# Patient Record
Sex: FEMALE | Race: WHITE | Employment: FULL TIME | URBAN - METROPOLITAN AREA
[De-identification: names, ages, dates, MRNs, and addresses within clinical notes are randomized per-mention and may not be internally consistent; named-entity substitution may affect disease eponyms.]

---

## 2021-12-30 ENCOUNTER — ANESTHESIA (OUTPATIENT)
Dept: SURGERY | Age: 26
DRG: 742 | End: 2021-12-30

## 2021-12-30 ENCOUNTER — APPOINTMENT (OUTPATIENT)
Dept: CT IMAGING | Age: 26
DRG: 742 | End: 2021-12-30
Attending: EMERGENCY MEDICINE

## 2021-12-30 ENCOUNTER — HOSPITAL ENCOUNTER (INPATIENT)
Age: 26
LOS: 1 days | Discharge: HOME | DRG: 742 | End: 2021-12-31
Attending: EMERGENCY MEDICINE | Admitting: OBSTETRICS & GYNECOLOGY

## 2021-12-30 ENCOUNTER — ANESTHESIA EVENT (OUTPATIENT)
Dept: SURGERY | Age: 26
DRG: 742 | End: 2021-12-30

## 2021-12-30 DIAGNOSIS — N13.30 HYDRONEPHROSIS, UNSPECIFIED HYDRONEPHROSIS TYPE: ICD-10-CM

## 2021-12-30 DIAGNOSIS — R19.00 PELVIC MASS: ICD-10-CM

## 2021-12-30 DIAGNOSIS — D49.59 OVARIAN NEOPLASM: Primary | ICD-10-CM

## 2021-12-30 LAB
ALBUMIN SERPL-MCNC: 4.2 G/DL (ref 3.5–5)
ALBUMIN/GLOB SERPL: 1 {RATIO} (ref 1.1–2.2)
ALP SERPL-CCNC: 76 U/L (ref 45–117)
ALT SERPL-CCNC: 17 U/L (ref 12–78)
ANION GAP SERPL CALC-SCNC: 5 MMOL/L (ref 5–15)
APPEARANCE UR: CLEAR
AST SERPL-CCNC: 17 U/L (ref 15–37)
BACTERIA URNS QL MICRO: NEGATIVE /HPF
BASOPHILS # BLD: 0.1 K/UL (ref 0–0.1)
BASOPHILS NFR BLD: 1 % (ref 0–1)
BILIRUB SERPL-MCNC: 0.6 MG/DL (ref 0.2–1)
BILIRUB UR QL: NEGATIVE
BUN SERPL-MCNC: 16 MG/DL (ref 6–20)
BUN/CREAT SERPL: 19 (ref 12–20)
CALCIUM SERPL-MCNC: 9.2 MG/DL (ref 8.5–10.1)
CHLORIDE SERPL-SCNC: 104 MMOL/L (ref 97–108)
CO2 SERPL-SCNC: 27 MMOL/L (ref 21–32)
COLOR UR: ABNORMAL
COMMENT, HOLDF: NORMAL
COVID-19 RAPID TEST, COVR: NOT DETECTED
CREAT SERPL-MCNC: 0.84 MG/DL (ref 0.55–1.02)
DIFFERENTIAL METHOD BLD: ABNORMAL
EOSINOPHIL # BLD: 0 K/UL (ref 0–0.4)
EOSINOPHIL NFR BLD: 0 % (ref 0–7)
EPITH CASTS URNS QL MICRO: ABNORMAL /LPF
ERYTHROCYTE [DISTWIDTH] IN BLOOD BY AUTOMATED COUNT: 13.2 % (ref 11.5–14.5)
GLOBULIN SER CALC-MCNC: 4.2 G/DL (ref 2–4)
GLUCOSE SERPL-MCNC: 88 MG/DL (ref 65–100)
GLUCOSE UR STRIP.AUTO-MCNC: NEGATIVE MG/DL
HCG UR QL: NEGATIVE
HCT VFR BLD AUTO: 44.1 % (ref 35–47)
HGB BLD-MCNC: 14.1 G/DL (ref 11.5–16)
HGB UR QL STRIP: ABNORMAL
HYALINE CASTS URNS QL MICRO: ABNORMAL /LPF (ref 0–5)
IMM GRANULOCYTES # BLD AUTO: 0 K/UL (ref 0–0.04)
IMM GRANULOCYTES NFR BLD AUTO: 0 % (ref 0–0.5)
KETONES UR QL STRIP.AUTO: NEGATIVE MG/DL
LEUKOCYTE ESTERASE UR QL STRIP.AUTO: NEGATIVE
LIPASE SERPL-CCNC: 91 U/L (ref 73–393)
LYMPHOCYTES # BLD: 2.6 K/UL (ref 0.8–3.5)
LYMPHOCYTES NFR BLD: 23 % (ref 12–49)
MCH RBC QN AUTO: 27.2 PG (ref 26–34)
MCHC RBC AUTO-ENTMCNC: 32 G/DL (ref 30–36.5)
MCV RBC AUTO: 85.1 FL (ref 80–99)
MONOCYTES # BLD: 0.7 K/UL (ref 0–1)
MONOCYTES NFR BLD: 6 % (ref 5–13)
NEUTS SEG # BLD: 7.9 K/UL (ref 1.8–8)
NEUTS SEG NFR BLD: 70 % (ref 32–75)
NITRITE UR QL STRIP.AUTO: NEGATIVE
NRBC # BLD: 0 K/UL (ref 0–0.01)
NRBC BLD-RTO: 0 PER 100 WBC
PH UR STRIP: 6 [PH] (ref 5–8)
PLATELET # BLD AUTO: 354 K/UL (ref 150–400)
PMV BLD AUTO: 10.7 FL (ref 8.9–12.9)
POTASSIUM SERPL-SCNC: 3.6 MMOL/L (ref 3.5–5.1)
PROT SERPL-MCNC: 8.4 G/DL (ref 6.4–8.2)
PROT UR STRIP-MCNC: NEGATIVE MG/DL
RBC # BLD AUTO: 5.18 M/UL (ref 3.8–5.2)
RBC #/AREA URNS HPF: ABNORMAL /HPF (ref 0–5)
SAMPLES BEING HELD,HOLD: NORMAL
SODIUM SERPL-SCNC: 136 MMOL/L (ref 136–145)
SOURCE, COVRS: NORMAL
SP GR UR REFRACTOMETRY: 1.02 (ref 1–1.03)
UR CULT HOLD, URHOLD: NORMAL
UROBILINOGEN UR QL STRIP.AUTO: 0.2 EU/DL (ref 0.2–1)
WBC # BLD AUTO: 11.4 K/UL (ref 3.6–11)
WBC URNS QL MICRO: ABNORMAL /HPF (ref 0–4)

## 2021-12-30 PROCEDURE — 65410000002 HC RM PRIVATE OB

## 2021-12-30 PROCEDURE — 77030011264 HC ELECTRD BLD EXT COVD -A: Performed by: OBSTETRICS & GYNECOLOGY

## 2021-12-30 PROCEDURE — 2709999900 HC NON-CHARGEABLE SUPPLY: Performed by: OBSTETRICS & GYNECOLOGY

## 2021-12-30 PROCEDURE — 88307 TISSUE EXAM BY PATHOLOGIST: CPT

## 2021-12-30 PROCEDURE — 77030040361 HC SLV COMPR DVT MDII -B: Performed by: OBSTETRICS & GYNECOLOGY

## 2021-12-30 PROCEDURE — 77030011278 HC ELECTRD LIG IMPT COVD -F: Performed by: OBSTETRICS & GYNECOLOGY

## 2021-12-30 PROCEDURE — 99284 EMERGENCY DEPT VISIT MOD MDM: CPT

## 2021-12-30 PROCEDURE — 76210000016 HC OR PH I REC 1 TO 1.5 HR: Performed by: OBSTETRICS & GYNECOLOGY

## 2021-12-30 PROCEDURE — 74011000250 HC RX REV CODE- 250: Performed by: ANESTHESIOLOGY

## 2021-12-30 PROCEDURE — 76060000034 HC ANESTHESIA 1.5 TO 2 HR: Performed by: OBSTETRICS & GYNECOLOGY

## 2021-12-30 PROCEDURE — 80053 COMPREHEN METABOLIC PANEL: CPT

## 2021-12-30 PROCEDURE — 77030040830 HC CATH URETH FOL MDII -A: Performed by: OBSTETRICS & GYNECOLOGY

## 2021-12-30 PROCEDURE — 74011000250 HC RX REV CODE- 250: Performed by: NURSE ANESTHETIST, CERTIFIED REGISTERED

## 2021-12-30 PROCEDURE — P9045 ALBUMIN (HUMAN), 5%, 250 ML: HCPCS | Performed by: NURSE ANESTHETIST, CERTIFIED REGISTERED

## 2021-12-30 PROCEDURE — 81025 URINE PREGNANCY TEST: CPT

## 2021-12-30 PROCEDURE — 77030002996 HC SUT SLK J&J -A: Performed by: OBSTETRICS & GYNECOLOGY

## 2021-12-30 PROCEDURE — 77030026438 HC STYL ET INTUB CARD -A: Performed by: NURSE ANESTHETIST, CERTIFIED REGISTERED

## 2021-12-30 PROCEDURE — 81001 URINALYSIS AUTO W/SCOPE: CPT

## 2021-12-30 PROCEDURE — 77030002966 HC SUT PDS J&J -A: Performed by: OBSTETRICS & GYNECOLOGY

## 2021-12-30 PROCEDURE — 74011250637 HC RX REV CODE- 250/637: Performed by: OBSTETRICS & GYNECOLOGY

## 2021-12-30 PROCEDURE — 76010000153 HC OR TIME 1.5 TO 2 HR: Performed by: OBSTETRICS & GYNECOLOGY

## 2021-12-30 PROCEDURE — 77030002933 HC SUT MCRYL J&J -A: Performed by: OBSTETRICS & GYNECOLOGY

## 2021-12-30 PROCEDURE — 74011250636 HC RX REV CODE- 250/636: Performed by: ANESTHESIOLOGY

## 2021-12-30 PROCEDURE — 77030008684 HC TU ET CUF COVD -B: Performed by: NURSE ANESTHETIST, CERTIFIED REGISTERED

## 2021-12-30 PROCEDURE — 74011000636 HC RX REV CODE- 636: Performed by: RADIOLOGY

## 2021-12-30 PROCEDURE — 85025 COMPLETE CBC W/AUTO DIFF WBC: CPT

## 2021-12-30 PROCEDURE — 77030012935 HC DRSG AQUACEL BMS -B: Performed by: OBSTETRICS & GYNECOLOGY

## 2021-12-30 PROCEDURE — 74011250636 HC RX REV CODE- 250/636: Performed by: OBSTETRICS & GYNECOLOGY

## 2021-12-30 PROCEDURE — 83690 ASSAY OF LIPASE: CPT

## 2021-12-30 PROCEDURE — 0UT60ZZ RESECTION OF LEFT FALLOPIAN TUBE, OPEN APPROACH: ICD-10-PCS | Performed by: OBSTETRICS & GYNECOLOGY

## 2021-12-30 PROCEDURE — 87635 SARS-COV-2 COVID-19 AMP PRB: CPT

## 2021-12-30 PROCEDURE — 74177 CT ABD & PELVIS W/CONTRAST: CPT

## 2021-12-30 PROCEDURE — G0378 HOSPITAL OBSERVATION PER HR: HCPCS

## 2021-12-30 PROCEDURE — 74011250636 HC RX REV CODE- 250/636: Performed by: NURSE ANESTHETIST, CERTIFIED REGISTERED

## 2021-12-30 PROCEDURE — 77030031139 HC SUT VCRL2 J&J -A: Performed by: OBSTETRICS & GYNECOLOGY

## 2021-12-30 PROCEDURE — 0UT10ZZ RESECTION OF LEFT OVARY, OPEN APPROACH: ICD-10-PCS | Performed by: OBSTETRICS & GYNECOLOGY

## 2021-12-30 RX ORDER — LIDOCAINE HYDROCHLORIDE 10 MG/ML
0.1 INJECTION, SOLUTION EPIDURAL; INFILTRATION; INTRACAUDAL; PERINEURAL AS NEEDED
Status: DISCONTINUED | OUTPATIENT
Start: 2021-12-30 | End: 2021-12-30 | Stop reason: HOSPADM

## 2021-12-30 RX ORDER — ONDANSETRON 2 MG/ML
INJECTION INTRAMUSCULAR; INTRAVENOUS AS NEEDED
Status: DISCONTINUED | OUTPATIENT
Start: 2021-12-30 | End: 2021-12-30 | Stop reason: HOSPADM

## 2021-12-30 RX ORDER — OXYCODONE AND ACETAMINOPHEN 5; 325 MG/1; MG/1
1 TABLET ORAL AS NEEDED
Status: DISCONTINUED | OUTPATIENT
Start: 2021-12-30 | End: 2021-12-30 | Stop reason: HOSPADM

## 2021-12-30 RX ORDER — MIDAZOLAM HYDROCHLORIDE 1 MG/ML
INJECTION, SOLUTION INTRAMUSCULAR; INTRAVENOUS AS NEEDED
Status: DISCONTINUED | OUTPATIENT
Start: 2021-12-30 | End: 2021-12-30 | Stop reason: HOSPADM

## 2021-12-30 RX ORDER — ACETAMINOPHEN 325 MG/1
650 TABLET ORAL EVERY 6 HOURS
Status: DISCONTINUED | OUTPATIENT
Start: 2021-12-30 | End: 2021-12-31 | Stop reason: HOSPADM

## 2021-12-30 RX ORDER — CEFAZOLIN SODIUM 1 G/3ML
INJECTION, POWDER, FOR SOLUTION INTRAMUSCULAR; INTRAVENOUS AS NEEDED
Status: DISCONTINUED | OUTPATIENT
Start: 2021-12-30 | End: 2021-12-30 | Stop reason: HOSPADM

## 2021-12-30 RX ORDER — SODIUM CHLORIDE 9 MG/ML
100 INJECTION, SOLUTION INTRAVENOUS CONTINUOUS
Status: DISCONTINUED | OUTPATIENT
Start: 2021-12-30 | End: 2021-12-31 | Stop reason: HOSPADM

## 2021-12-30 RX ORDER — LORAZEPAM 2 MG/ML
1 INJECTION INTRAMUSCULAR
Status: DISCONTINUED | OUTPATIENT
Start: 2021-12-30 | End: 2021-12-31 | Stop reason: HOSPADM

## 2021-12-30 RX ORDER — TRAMADOL HYDROCHLORIDE 50 MG/1
50 TABLET ORAL
Qty: 20 TABLET | Refills: 0 | Status: SHIPPED | OUTPATIENT
Start: 2021-12-30 | End: 2022-01-04

## 2021-12-30 RX ORDER — SUCCINYLCHOLINE CHLORIDE 20 MG/ML
INJECTION INTRAMUSCULAR; INTRAVENOUS AS NEEDED
Status: DISCONTINUED | OUTPATIENT
Start: 2021-12-30 | End: 2021-12-30 | Stop reason: HOSPADM

## 2021-12-30 RX ORDER — SODIUM CHLORIDE 0.9 % (FLUSH) 0.9 %
5-40 SYRINGE (ML) INJECTION AS NEEDED
Status: DISCONTINUED | OUTPATIENT
Start: 2021-12-30 | End: 2021-12-30 | Stop reason: HOSPADM

## 2021-12-30 RX ORDER — DEXMEDETOMIDINE HYDROCHLORIDE 100 UG/ML
INJECTION, SOLUTION INTRAVENOUS AS NEEDED
Status: DISCONTINUED | OUTPATIENT
Start: 2021-12-30 | End: 2021-12-30 | Stop reason: HOSPADM

## 2021-12-30 RX ORDER — DIPHENHYDRAMINE HYDROCHLORIDE 50 MG/ML
12.5 INJECTION, SOLUTION INTRAMUSCULAR; INTRAVENOUS AS NEEDED
Status: DISCONTINUED | OUTPATIENT
Start: 2021-12-30 | End: 2021-12-30 | Stop reason: HOSPADM

## 2021-12-30 RX ORDER — DIPHENHYDRAMINE HYDROCHLORIDE 50 MG/ML
12.5 INJECTION, SOLUTION INTRAMUSCULAR; INTRAVENOUS
Status: DISCONTINUED | OUTPATIENT
Start: 2021-12-30 | End: 2021-12-31 | Stop reason: HOSPADM

## 2021-12-30 RX ORDER — KETOROLAC TROMETHAMINE 30 MG/ML
INJECTION, SOLUTION INTRAMUSCULAR; INTRAVENOUS AS NEEDED
Status: DISCONTINUED | OUTPATIENT
Start: 2021-12-30 | End: 2021-12-30 | Stop reason: HOSPADM

## 2021-12-30 RX ORDER — SODIUM CHLORIDE 0.9 % (FLUSH) 0.9 %
5-40 SYRINGE (ML) INJECTION EVERY 8 HOURS
Status: DISCONTINUED | OUTPATIENT
Start: 2021-12-30 | End: 2021-12-30 | Stop reason: HOSPADM

## 2021-12-30 RX ORDER — LIDOCAINE HYDROCHLORIDE 20 MG/ML
INJECTION, SOLUTION EPIDURAL; INFILTRATION; INTRACAUDAL; PERINEURAL AS NEEDED
Status: DISCONTINUED | OUTPATIENT
Start: 2021-12-30 | End: 2021-12-30 | Stop reason: HOSPADM

## 2021-12-30 RX ORDER — HYDROMORPHONE HYDROCHLORIDE 2 MG/ML
INJECTION, SOLUTION INTRAMUSCULAR; INTRAVENOUS; SUBCUTANEOUS AS NEEDED
Status: DISCONTINUED | OUTPATIENT
Start: 2021-12-30 | End: 2021-12-30 | Stop reason: HOSPADM

## 2021-12-30 RX ORDER — NEOSTIGMINE METHYLSULFATE 1 MG/ML
INJECTION, SOLUTION INTRAVENOUS AS NEEDED
Status: DISCONTINUED | OUTPATIENT
Start: 2021-12-30 | End: 2021-12-30 | Stop reason: HOSPADM

## 2021-12-30 RX ORDER — HYDROMORPHONE HYDROCHLORIDE 1 MG/ML
1 INJECTION, SOLUTION INTRAMUSCULAR; INTRAVENOUS; SUBCUTANEOUS
Status: DISCONTINUED | OUTPATIENT
Start: 2021-12-30 | End: 2021-12-30 | Stop reason: HOSPADM

## 2021-12-30 RX ORDER — SODIUM CHLORIDE, SODIUM LACTATE, POTASSIUM CHLORIDE, CALCIUM CHLORIDE 600; 310; 30; 20 MG/100ML; MG/100ML; MG/100ML; MG/100ML
INJECTION, SOLUTION INTRAVENOUS
Status: DISCONTINUED | OUTPATIENT
Start: 2021-12-30 | End: 2021-12-30 | Stop reason: HOSPADM

## 2021-12-30 RX ORDER — KETAMINE HYDROCHLORIDE 10 MG/ML
INJECTION, SOLUTION INTRAMUSCULAR; INTRAVENOUS AS NEEDED
Status: DISCONTINUED | OUTPATIENT
Start: 2021-12-30 | End: 2021-12-30 | Stop reason: HOSPADM

## 2021-12-30 RX ORDER — ROCURONIUM BROMIDE 10 MG/ML
INJECTION, SOLUTION INTRAVENOUS AS NEEDED
Status: DISCONTINUED | OUTPATIENT
Start: 2021-12-30 | End: 2021-12-30 | Stop reason: HOSPADM

## 2021-12-30 RX ORDER — MIDAZOLAM HYDROCHLORIDE 1 MG/ML
0.5 INJECTION, SOLUTION INTRAMUSCULAR; INTRAVENOUS
Status: DISCONTINUED | OUTPATIENT
Start: 2021-12-30 | End: 2021-12-30 | Stop reason: HOSPADM

## 2021-12-30 RX ORDER — ALBUMIN HUMAN 50 G/1000ML
SOLUTION INTRAVENOUS AS NEEDED
Status: DISCONTINUED | OUTPATIENT
Start: 2021-12-30 | End: 2021-12-30 | Stop reason: HOSPADM

## 2021-12-30 RX ORDER — GLYCOPYRROLATE 0.2 MG/ML
INJECTION INTRAMUSCULAR; INTRAVENOUS AS NEEDED
Status: DISCONTINUED | OUTPATIENT
Start: 2021-12-30 | End: 2021-12-30 | Stop reason: HOSPADM

## 2021-12-30 RX ORDER — TRAMADOL HYDROCHLORIDE 50 MG/1
50 TABLET ORAL
Status: DISCONTINUED | OUTPATIENT
Start: 2021-12-30 | End: 2021-12-31 | Stop reason: HOSPADM

## 2021-12-30 RX ORDER — MORPHINE SULFATE 2 MG/ML
2 INJECTION, SOLUTION INTRAMUSCULAR; INTRAVENOUS
Status: DISCONTINUED | OUTPATIENT
Start: 2021-12-30 | End: 2021-12-30 | Stop reason: HOSPADM

## 2021-12-30 RX ORDER — FENTANYL CITRATE 50 UG/ML
INJECTION, SOLUTION INTRAMUSCULAR; INTRAVENOUS AS NEEDED
Status: DISCONTINUED | OUTPATIENT
Start: 2021-12-30 | End: 2021-12-30 | Stop reason: HOSPADM

## 2021-12-30 RX ORDER — KETOROLAC TROMETHAMINE 30 MG/ML
30 INJECTION, SOLUTION INTRAMUSCULAR; INTRAVENOUS EVERY 6 HOURS
Status: DISCONTINUED | OUTPATIENT
Start: 2021-12-30 | End: 2021-12-31 | Stop reason: HOSPADM

## 2021-12-30 RX ORDER — PROPOFOL 10 MG/ML
INJECTION, EMULSION INTRAVENOUS AS NEEDED
Status: DISCONTINUED | OUTPATIENT
Start: 2021-12-30 | End: 2021-12-30 | Stop reason: HOSPADM

## 2021-12-30 RX ORDER — SODIUM CHLORIDE 0.9 % (FLUSH) 0.9 %
5-40 SYRINGE (ML) INJECTION EVERY 8 HOURS
Status: DISCONTINUED | OUTPATIENT
Start: 2021-12-30 | End: 2021-12-31 | Stop reason: HOSPADM

## 2021-12-30 RX ORDER — SODIUM CHLORIDE 9 MG/ML
50 INJECTION, SOLUTION INTRAVENOUS CONTINUOUS
Status: DISCONTINUED | OUTPATIENT
Start: 2021-12-30 | End: 2021-12-30 | Stop reason: HOSPADM

## 2021-12-30 RX ORDER — FENTANYL CITRATE 50 UG/ML
50 INJECTION, SOLUTION INTRAMUSCULAR; INTRAVENOUS AS NEEDED
Status: DISCONTINUED | OUTPATIENT
Start: 2021-12-30 | End: 2021-12-30 | Stop reason: HOSPADM

## 2021-12-30 RX ORDER — ONDANSETRON 2 MG/ML
4 INJECTION INTRAMUSCULAR; INTRAVENOUS
Status: DISCONTINUED | OUTPATIENT
Start: 2021-12-30 | End: 2021-12-31 | Stop reason: HOSPADM

## 2021-12-30 RX ORDER — DOCUSATE SODIUM 100 MG/1
100 CAPSULE, LIQUID FILLED ORAL DAILY
Qty: 30 CAPSULE | Refills: 0 | Status: SHIPPED | OUTPATIENT
Start: 2021-12-30 | End: 2022-01-29

## 2021-12-30 RX ORDER — DEXAMETHASONE SODIUM PHOSPHATE 4 MG/ML
INJECTION, SOLUTION INTRA-ARTICULAR; INTRALESIONAL; INTRAMUSCULAR; INTRAVENOUS; SOFT TISSUE AS NEEDED
Status: DISCONTINUED | OUTPATIENT
Start: 2021-12-30 | End: 2021-12-30 | Stop reason: HOSPADM

## 2021-12-30 RX ORDER — SODIUM CHLORIDE, SODIUM LACTATE, POTASSIUM CHLORIDE, CALCIUM CHLORIDE 600; 310; 30; 20 MG/100ML; MG/100ML; MG/100ML; MG/100ML
75 INJECTION, SOLUTION INTRAVENOUS CONTINUOUS
Status: DISCONTINUED | OUTPATIENT
Start: 2021-12-30 | End: 2021-12-30 | Stop reason: HOSPADM

## 2021-12-30 RX ORDER — MIDAZOLAM HYDROCHLORIDE 1 MG/ML
1 INJECTION, SOLUTION INTRAMUSCULAR; INTRAVENOUS AS NEEDED
Status: DISCONTINUED | OUTPATIENT
Start: 2021-12-30 | End: 2021-12-30 | Stop reason: HOSPADM

## 2021-12-30 RX ORDER — OXYCODONE HYDROCHLORIDE 5 MG/1
5 TABLET ORAL
Status: DISCONTINUED | OUTPATIENT
Start: 2021-12-30 | End: 2021-12-31 | Stop reason: HOSPADM

## 2021-12-30 RX ORDER — SODIUM CHLORIDE 0.9 % (FLUSH) 0.9 %
5-40 SYRINGE (ML) INJECTION AS NEEDED
Status: DISCONTINUED | OUTPATIENT
Start: 2021-12-30 | End: 2021-12-31 | Stop reason: HOSPADM

## 2021-12-30 RX ORDER — HYDROMORPHONE HYDROCHLORIDE 1 MG/ML
0.2 INJECTION, SOLUTION INTRAMUSCULAR; INTRAVENOUS; SUBCUTANEOUS
Status: DISCONTINUED | OUTPATIENT
Start: 2021-12-30 | End: 2021-12-30 | Stop reason: HOSPADM

## 2021-12-30 RX ORDER — ACETAMINOPHEN 325 MG/1
650 TABLET ORAL
Status: DISCONTINUED | OUTPATIENT
Start: 2021-12-30 | End: 2021-12-30 | Stop reason: HOSPADM

## 2021-12-30 RX ORDER — ONDANSETRON 2 MG/ML
4 INJECTION INTRAMUSCULAR; INTRAVENOUS AS NEEDED
Status: DISCONTINUED | OUTPATIENT
Start: 2021-12-30 | End: 2021-12-30 | Stop reason: HOSPADM

## 2021-12-30 RX ORDER — IBUPROFEN 600 MG/1
600 TABLET ORAL
Qty: 40 TABLET | Refills: 0 | Status: SHIPPED | OUTPATIENT
Start: 2021-12-30

## 2021-12-30 RX ORDER — HYDROMORPHONE HYDROCHLORIDE 1 MG/ML
1 INJECTION, SOLUTION INTRAMUSCULAR; INTRAVENOUS; SUBCUTANEOUS
Status: DISCONTINUED | OUTPATIENT
Start: 2021-12-30 | End: 2021-12-31 | Stop reason: HOSPADM

## 2021-12-30 RX ORDER — ESMOLOL HYDROCHLORIDE 10 MG/ML
INJECTION INTRAVENOUS AS NEEDED
Status: DISCONTINUED | OUTPATIENT
Start: 2021-12-30 | End: 2021-12-30 | Stop reason: HOSPADM

## 2021-12-30 RX ORDER — FENTANYL CITRATE 50 UG/ML
25 INJECTION, SOLUTION INTRAMUSCULAR; INTRAVENOUS
Status: DISCONTINUED | OUTPATIENT
Start: 2021-12-30 | End: 2021-12-30 | Stop reason: HOSPADM

## 2021-12-30 RX ADMIN — MIDAZOLAM 2 MG: 1 INJECTION INTRAMUSCULAR; INTRAVENOUS at 15:40

## 2021-12-30 RX ADMIN — ONDANSETRON 4 MG: 2 INJECTION INTRAMUSCULAR; INTRAVENOUS at 19:52

## 2021-12-30 RX ADMIN — IOPAMIDOL 100 ML: 755 INJECTION, SOLUTION INTRAVENOUS at 12:00

## 2021-12-30 RX ADMIN — ALBUMIN (HUMAN) 250 ML: 12.5 INJECTION, SOLUTION INTRAVENOUS at 16:24

## 2021-12-30 RX ADMIN — PROPOFOL 50 MG: 10 INJECTION, EMULSION INTRAVENOUS at 16:59

## 2021-12-30 RX ADMIN — LIDOCAINE HYDROCHLORIDE 100 MG: 20 INJECTION, SOLUTION EPIDURAL; INFILTRATION; INTRACAUDAL; PERINEURAL at 15:48

## 2021-12-30 RX ADMIN — ACETAMINOPHEN 650 MG: 325 TABLET ORAL at 23:49

## 2021-12-30 RX ADMIN — SUCCINYLCHOLINE CHLORIDE 180 MG: 20 INJECTION, SOLUTION INTRAMUSCULAR; INTRAVENOUS at 15:49

## 2021-12-30 RX ADMIN — ALBUMIN (HUMAN) 250 ML: 12.5 INJECTION, SOLUTION INTRAVENOUS at 15:55

## 2021-12-30 RX ADMIN — MEPERIDINE HYDROCHLORIDE 12.5 MG: 25 INJECTION, SOLUTION INTRAMUSCULAR; INTRAVENOUS; SUBCUTANEOUS at 17:48

## 2021-12-30 RX ADMIN — KETOROLAC TROMETHAMINE 30 MG: 30 INJECTION, SOLUTION INTRAMUSCULAR; INTRAVENOUS at 23:49

## 2021-12-30 RX ADMIN — DEXMEDETOMIDINE HYDROCHLORIDE 10 MCG: 100 INJECTION, SOLUTION, CONCENTRATE INTRAVENOUS at 17:28

## 2021-12-30 RX ADMIN — FENTANYL CITRATE 50 MCG: 50 INJECTION INTRAMUSCULAR; INTRAVENOUS at 17:45

## 2021-12-30 RX ADMIN — DEXAMETHASONE SODIUM PHOSPHATE 4 MG: 4 INJECTION, SOLUTION INTRAMUSCULAR; INTRAVENOUS at 15:55

## 2021-12-30 RX ADMIN — DEXMEDETOMIDINE HYDROCHLORIDE 5 MCG: 100 INJECTION, SOLUTION, CONCENTRATE INTRAVENOUS at 17:23

## 2021-12-30 RX ADMIN — FENTANYL CITRATE 100 MCG: 50 INJECTION, SOLUTION INTRAMUSCULAR; INTRAVENOUS at 15:48

## 2021-12-30 RX ADMIN — Medication 30 MG: at 15:48

## 2021-12-30 RX ADMIN — CEFAZOLIN 2 G: 330 INJECTION, POWDER, FOR SOLUTION INTRAMUSCULAR; INTRAVENOUS at 16:00

## 2021-12-30 RX ADMIN — SODIUM CHLORIDE 100 ML/HR: 900 INJECTION, SOLUTION INTRAVENOUS at 18:22

## 2021-12-30 RX ADMIN — ROCURONIUM BROMIDE 5 MG: 10 SOLUTION INTRAVENOUS at 15:48

## 2021-12-30 RX ADMIN — Medication 3 MG: at 16:53

## 2021-12-30 RX ADMIN — HYDROMORPHONE HYDROCHLORIDE 0.5 MG: 2 INJECTION, SOLUTION INTRAMUSCULAR; INTRAVENOUS; SUBCUTANEOUS at 17:33

## 2021-12-30 RX ADMIN — SODIUM CHLORIDE, POTASSIUM CHLORIDE, SODIUM LACTATE AND CALCIUM CHLORIDE: 600; 310; 30; 20 INJECTION, SOLUTION INTRAVENOUS at 15:39

## 2021-12-30 RX ADMIN — Medication 10 ML: at 22:00

## 2021-12-30 RX ADMIN — FENTANYL CITRATE 25 MCG: 50 INJECTION INTRAMUSCULAR; INTRAVENOUS at 18:00

## 2021-12-30 RX ADMIN — DEXMEDETOMIDINE HYDROCHLORIDE 5 MCG: 100 INJECTION, SOLUTION, CONCENTRATE INTRAVENOUS at 17:25

## 2021-12-30 RX ADMIN — ROCURONIUM BROMIDE 35 MG: 10 SOLUTION INTRAVENOUS at 15:58

## 2021-12-30 RX ADMIN — ONDANSETRON HYDROCHLORIDE 4 MG: 2 INJECTION, SOLUTION INTRAMUSCULAR; INTRAVENOUS at 15:55

## 2021-12-30 RX ADMIN — PROPOFOL 200 MG: 10 INJECTION, EMULSION INTRAVENOUS at 15:48

## 2021-12-30 RX ADMIN — KETOROLAC TROMETHAMINE 30 MG: 30 INJECTION, SOLUTION INTRAMUSCULAR; INTRAVENOUS at 16:51

## 2021-12-30 RX ADMIN — ESMOLOL HYDROCHLORIDE 20 MG: 10 INJECTION, SOLUTION INTRAVENOUS at 16:06

## 2021-12-30 RX ADMIN — HYDROMORPHONE HYDROCHLORIDE 0.5 MG: 2 INJECTION, SOLUTION INTRAMUSCULAR; INTRAVENOUS; SUBCUTANEOUS at 16:32

## 2021-12-30 RX ADMIN — GLYCOPYRROLATE 0.4 MG: 0.2 INJECTION, SOLUTION INTRAMUSCULAR; INTRAVENOUS at 16:53

## 2021-12-30 RX ADMIN — FENTANYL CITRATE 25 MCG: 50 INJECTION INTRAMUSCULAR; INTRAVENOUS at 17:55

## 2021-12-30 RX ADMIN — PROPOFOL 100 MG: 10 INJECTION, EMULSION INTRAVENOUS at 16:56

## 2021-12-30 RX ADMIN — Medication 20 MG: at 16:03

## 2021-12-30 RX ADMIN — SODIUM CHLORIDE, POTASSIUM CHLORIDE, SODIUM LACTATE AND CALCIUM CHLORIDE 75 ML/HR: 600; 310; 30; 20 INJECTION, SOLUTION INTRAVENOUS at 15:37

## 2021-12-30 NOTE — ANESTHESIA POSTPROCEDURE EVALUATION
Procedure(s):  LAPAROTOMY EXPLORATORY/ RESECTION MASS. general    Anesthesia Post Evaluation      Multimodal analgesia: multimodal analgesia used between 6 hours prior to anesthesia start to PACU discharge  Patient location during evaluation: PACU  Patient participation: complete - patient participated  Level of consciousness: awake and alert  Pain management: adequate  Airway patency: patent  Anesthetic complications: no  Cardiovascular status: acceptable  Respiratory status: acceptable  Hydration status: acceptable  Comments: Seen, no complaints   Post anesthesia nausea and vomiting:  none  Final Post Anesthesia Temperature Assessment:  Normothermia (36.0-37.5 degrees C)      INITIAL Post-op Vital signs:   Vitals Value Taken Time   /81 12/30/21 1745   Temp 36.7 °C (98.1 °F) 12/30/21 1729   Pulse 85 12/30/21 1754   Resp 14 12/30/21 1754   SpO2 100 % 12/30/21 1754   Vitals shown include unvalidated device data.

## 2021-12-30 NOTE — ED PROVIDER NOTES
Abdominal Pain  Associated symptoms include abdominal pain. Pertinent negatives include no chest pain, no headaches and no shortness of breath. Patient is a 78-year-old female with no significant past medical history who presents to the ED with right upper and right lower abdominal pain for 1 week. She denies any falls, direct injury or blunt trauma. Denies any fever, nausea, vomiting, constipation or diarrhea. Nothing seems to make the pain better or worse. Last evening was the 1st night that the pain awoke her during the night. She she took an Excedrin without relief. Pain increases with walking and palpation. Last menstrual period was early December. No past medical history on file. No past surgical history on file. No family history on file. Social History     Socioeconomic History    Marital status: SINGLE     Spouse name: Not on file    Number of children: Not on file    Years of education: Not on file    Highest education level: Not on file   Occupational History    Not on file   Tobacco Use    Smoking status: Not on file    Smokeless tobacco: Not on file   Substance and Sexual Activity    Alcohol use: Not on file    Drug use: Not on file    Sexual activity: Not on file   Other Topics Concern    Not on file   Social History Narrative    Not on file     Social Determinants of Health     Financial Resource Strain:     Difficulty of Paying Living Expenses: Not on file   Food Insecurity:     Worried About Running Out of Food in the Last Year: Not on file    Nicko of Food in the Last Year: Not on file   Transportation Needs:     Lack of Transportation (Medical): Not on file    Lack of Transportation (Non-Medical):  Not on file   Physical Activity:     Days of Exercise per Week: Not on file    Minutes of Exercise per Session: Not on file   Stress:     Feeling of Stress : Not on file   Social Connections:     Frequency of Communication with Friends and Family: Not on file    Frequency of Social Gatherings with Friends and Family: Not on file    Attends Caodaism Services: Not on file    Active Member of Clubs or Organizations: Not on file    Attends Club or Organization Meetings: Not on file    Marital Status: Not on file   Intimate Partner Violence:     Fear of Current or Ex-Partner: Not on file    Emotionally Abused: Not on file    Physically Abused: Not on file    Sexually Abused: Not on file   Housing Stability:     Unable to Pay for Housing in the Last Year: Not on file    Number of [place] in the Last Year: Not on file    Unstable Housing in the Last Year: Not on file         ALLERGIES: Patient has no known allergies. Review of Systems   Constitutional: Negative for activity change, appetite change, fever and unexpected weight change. HENT: Negative for congestion, rhinorrhea and trouble swallowing. Eyes: Negative for visual disturbance. Respiratory: Negative for cough and shortness of breath. Cardiovascular: Negative for chest pain, palpitations and leg swelling. Gastrointestinal: Positive for abdominal pain. Negative for constipation and diarrhea. Genitourinary: Negative for dysuria, pelvic pain, vaginal bleeding and vaginal discharge. Musculoskeletal: Negative for back pain and neck pain. Skin: Negative for rash. Neurological: Negative for headaches. All other systems reviewed and are negative. Vitals:    12/30/21 0904   BP: (!) 151/104   Pulse: 98   Resp: 20   Temp: 98 °F (36.7 °C)   SpO2: 98%   Weight: 110 kg (242 lb 8.1 oz)   Height: 5' 3\" (1.6 m)            Physical Exam  Vitals and nursing note reviewed. Constitutional:       General: She is not in acute distress. Appearance: She is well-developed. She is not ill-appearing, toxic-appearing or diaphoretic. 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 Cardiovascular:      Rate and Rhythm: Normal rate and regular rhythm. Pulmonary:      Effort: Pulmonary effort is normal.      Breath sounds: Normal breath sounds. Abdominal:      General: Bowel sounds are normal. There is no distension. There are no signs of injury. Palpations: Abdomen is soft. Tenderness: There is abdominal tenderness in the right upper quadrant and right lower quadrant. There is no guarding or rebound. Negative signs include Hearn's sign. Hernia: No hernia is present. Skin:     General: Skin is warm and dry. Findings: No rash. Neurological:      General: No focal deficit present. Mental Status: She is alert and oriented to person, place, and time. Psychiatric:         Mood and Affect: Mood normal.         Behavior: Behavior normal.          MDM       Procedures      Labs Reviewed   URINALYSIS W/MICROSCOPIC - Abnormal; Notable for the following components:       Result Value    Blood SMALL (*)     All other components within normal limits   CBC WITH AUTOMATED DIFF - Abnormal; Notable for the following components:    WBC 11.4 (*)     All other components within normal limits   METABOLIC PANEL, COMPREHENSIVE - Abnormal; Notable for the following components:    Protein, total 8.4 (*)     Globulin 4.2 (*)     A-G Ratio 1.0 (*)     All other components within normal limits   URINE CULTURE HOLD SAMPLE   SAMPLES BEING HELD   LIPASE   HCG URINE, QL. - POC     CT ABD PELV W CONT    Result Date: 12/30/2021  1. 39.2 x 19.8 x 36.5 cm fluid density mass arising from the pelvis nearly filling the entire abdomen. 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 This results in severe right-sided hydronephrosis. Consultation with Octavio Murphy is recommended    Dr. Aida Cabello (oncology/GYN) was consulted and will come to the ED to evaluate and admit. Discussed plan of care with Dr. Claudean Held. 2:14 PM  Patient's results and plan of care have been reviewed with her.   Patient has verbally conveyed her understanding and agreement of her signs, symptoms, diagnosis, treatment and prognosis and additionally agrees to be admitted.  Andie Washington NP

## 2021-12-30 NOTE — BRIEF OP NOTE
Brief Postoperative Note    Patient: Ayaka Joaquin  YOB: 1995  MRN: [id number]    Date of Procedure: 12/30/2021     Pre-Op Diagnosis: Pelvic mass    Post-Op Diagnosis: Left ovarian cystic mass      Procedure(s):  EXPLORATORY LAPAROTOMY, RESECTION MASS, LEFT SALPINGOOOPHORECTOMY    Surgeon(s):  Leny Hernandez MD    Surgical Assistant: Physician Assistant: Olesya Andino PA-C    Anesthesia: General     Estimated Blood Loss (mL): Minimal    Complications: None    Specimens:   ID Type Source Tests Collected by Time Destination   1 : left adenexal mass  Fresh Lacie Mcclellan MD 12/30/2021 1636 Pathology        Implants: * No implants in log *    Drains:   Orogastric Tube 12/30/21 (Active)       Findings: Large, unilocular, cystic mass arising from the left ovary. The mass extended from the pelvic floor upward to the level of the liver, filling the entire abdomen. The mass was drained in order to remove it through a Pfannenstiel incision. The fluid was clear and serous. A total volume of 16.35 liters was drained. The cystic mass was opened on the back table and inspected. The interior and exterior surfaces were both completely smooth and without excrescences. There was nothing suspicious for malignancy.   The uterus and contralateral tube and ovary appeared normal.       Electronically Signed by Jose Alfredo Hernandez MD on 12/30/2021 at 5:01 PM

## 2021-12-30 NOTE — PERIOP NOTES
TRANSFER - OUT REPORT:    Verbal report given to Zoe Malik (name) on [facility] [address] Signs  being transferred to [facility] (unit) for routine post - op       Report consisted of patients Situation, Background, Assessment and   Recommendations(SBAR). Time Pre op antibiotic given:1600  Anesthesia Stop time: 3016  Nevarez Present on Transfer to floor:N/A  Order for Nevarez on Chart:N/A  Discharge Prescriptions with Chart:N/A    Information from the following report(s) SBAR, OR Summary, Procedure Summary, Intake/Output and MAR was reviewed with the receiving nurse. Opportunity for questions and clarification was provided. Is the patient on 02? NO       L/Min RA       Other N/A    Is the patient on a monitor? NO    Is the nurse transporting with the patient? NO    Surgical Waiting Area notified of patient's transfer from PACU? YES      The following personal items collected during your admission accompanied patient upon transfer:   Dental Appliance: Dental Appliances: None  Vision:    Hearing Aid:    Jewelry: Jewelry: Body Piercing,With patient (EAR PIERCING - LEFT UPPER EAR)  Clothing: Clothing:  (belonging bag sent to pacu)  Other Valuables:  Other Valuables: Keys (KEYS IN CLOTHING BAG TO PACU)  Valuables sent to safe: Personal Items Sent to Safe: SEE SECURITY RECORD FORM

## 2021-12-30 NOTE — DISCHARGE INSTRUCTIONS
[facility] [address]  P [phone]  F [phone]     Jaquan Evans      Dear Ms. Rosa Cristobal,      Please review your instructions with your nurse and ask any questions so you have all the information you need to recover well at home. If you do not feel you have everything you need to succeed and be safe after you leave the hospital, please discuss these concerns with your nurse. As always, call for any questions at home. Your doctor: Dr. Brooke Vail  Diagnosis: Pelvic mass [R19.00]  Procedure: Procedure(s):  LAPAROTOMY EXPLORATORY/ RESECTION MASS  Date of Discharge: 12/31/2021      Take Home Medications     See Discharge Medication Review provided to you by your nurse. If you did not receive one, request this prior to your discharge. · It is important that you take your medications as they are prescribed. · Keep your medications in the bottles provided by the pharmacist and keep a list of the medication names, dosages, times to be taken and what they are for in your wallet. · Do not take other medications without consulting your doctor. · If you no longer need your prescribed pain medication, you may take Tylenol or Aleve alone. · You should take a daily gentle stool softener such as a colace pill or dulcolax suppository for constipation as this is not uncommon after surgery and/or while on pain medication. If not prescribed, this can be found over the counter. If constipation persists for >24 hours you should take a dose of Milk of Magnesia. Call if your constipation continues. Diet    · Stay hydrated and drink fluids such as gatorade and water. This will also help prevent constipation and dehydration. Limit somewhat any usual caffeine intake of beverages such as soda, tea and coffee as this may serve to dehydrate you. · For the first 2-3 days keep a low fiber diet avoiding raw vegetables or fruits with skin.   A diet consisting of soup, cereal, yogurt, eggs, fish, Boost/Ensure. Avoid fatty/greasy foods. · If nauseated, keep your diet limited to liquids and call if this persists. Activity    · If possible, have someone with you at all times until you feel stable. · Gradually increase your activity each day. There are generally no restrictions on walking, climbing stairs or riding in a car. Try to walk at least 4 times per day. · Showers are okay. If you have an incision, no tub bathing/swimming for two weeks. · No driving for 2 week and/or while on pain medication. · If you had surgery, the following restrictions are in place:  1. If you had an open procedure, no heavy lifting greater than 15 lbs for 8 weeks. Incision    · You should expect some discomfort in the area of your incision, particularly as you increase your activity. If you notice an area of increasing redness or new drainage, please call your doctor. · Staples are generally removed in 10-14 days. · Many incisions will have buried absorbable sutures, which do not need to be removed and are covered by protective glue. This will come off over time. Causes For Concern    If any of the following occur, please call our office and speak with the Nurse/aid who will help you with your problem or ask the doctor to call you.  Problems with the incision, including increasing pain, swelling, redness or drainage.  Inability to pass urine    Increasing abdominal pain despite medication   Persisting nausea or vomiting.  Fever or chills and a temperature >101F   Constipation (no bowel movement for three days).  Diarrhea (more than three watery stools within 24 hours).  Excessive vaginal or wound bleeding.  If after hours and you cannot reach an on-call physician, call 911. Follow-Up    Call [phone] to schedule an appointment with Dr. Justyna Clements in 4 weeks.           Information obtained by :  I understand that if any problems occur once I am at home I am to contact my physician. I understand and acknowledge receipt of the instructions indicated above.                                                                                                                                            Physician's or R.N.'s Signature                                                                  Date/Time                                                                                                                                              Patient or Representative Signature                                                          Date/Time

## 2021-12-30 NOTE — CONSULTS
[address] [place] [address] [address], [address]  P [phone]  F [phone]       Symone Stein       [id number]  1995    Admitted 2021 Date 2021     Admit dx: No admission diagnoses are documented for this encounter. HPI:    Symone Stein is a 27 y.o.  female with no significant past medical history who presented to the ED today for abdominal pain. On CT scan, she was found to have a very large, cystic appearing pelvic mass. SUBJECTIVE:    Symone Stein is being seen for large, cystic pelvic mass. She states that she noted enlargement of her abdomen several months ago. She is uncertain of the exact time period. She states that she thought that she was simply gaining weight. She denies chest pain or shortness of breath. She denies nausea or bowel problems. No constipation, diarrhea. No urinary issues though she states that she had a UTI last week. She no abnormal vaginal discharge. She has recently had her menstrual period. Denies any history of GYN problems. She states that she has not had abdominal discomfort until about a week ago. She states that the abdominal pain became so severe that it woke her up from sleep last night. She has taken Excedrin with no relief. Pain worsened this morning and she decided to present to the ED.    OB/GYN ROS: There is no history of significant gyn problems or procedures. OB/GYN history:     Does not recall exact age at menarche. No previous surgical procedures. There is no problem list on file for this patient. No past medical history on file. No past surgical history on file. Social History     Tobacco Use    Smoking status: Not on file    Smokeless tobacco: Not on file   Substance Use Topics    Alcohol use: Not on file      No family history on file. No current facility-administered medications for this encounter. No current outpatient medications on file.      No Known Allergies     Review of Systems  Pertinent items are noted in the History of Present Illness. , 10 point ROS    OBJECTIVE:    Physical Exam  Visit Vitals  BP (!) 151/104 (BP 1 Location: Left upper arm, BP Patient Position: Sitting)   Pulse 98   Temp 98 °F (36.7 °C)   Resp 20   Ht 5' 3\" (1.6 m)   Wt 242 lb 8.1 oz (110 kg)   LMP 12/27/2021   SpO2 98%   BMI 42.96 kg/m²     General appearance: alert, cooperative, no distress, appears stated age  Eyes: negative  Lungs: clear to auscultation bilaterally  Heart: regular rate and rhythm, S1, S2 normal, no murmur, click, rub or gallop  Abdomen: large, rotund distended abdomen. Tenderness noted along right side of abdomen. Normal bowel sounds. Pelvic: deferred  Extremities: extremities normal, atraumatic, no cyanosis or edema  Pulses: 2+ and symmetric  Skin: Skin color, texture, turgor normal. No rashes or lesions  Lymph nodes: Cervical, supraclavicular, and axillary nodes normal.  Neurologic: Grossly normal    Data Review      Recent Results (from the past 24 hour(s))   URINALYSIS W/MICROSCOPIC    Collection Time: 12/30/21  9:31 AM   Result Value Ref Range    Color YELLOW/STRAW      Appearance CLEAR CLEAR      Specific gravity 1.022 1.003 - 1.030      pH (UA) 6.0 5.0 - 8.0      Protein Negative NEG mg/dL    Glucose Negative NEG mg/dL    Ketone Negative NEG mg/dL    Bilirubin Negative NEG      Blood SMALL (A) NEG      Urobilinogen 0.2 0.2 - 1.0 EU/dL    Nitrites Negative NEG      Leukocyte Esterase Negative NEG      WBC 0-4 0 - 4 /hpf    RBC 5-10 0 - 5 /hpf    Epithelial cells FEW FEW /lpf    Bacteria Negative NEG /hpf    Hyaline cast 0-2 0 - 5 /lpf   URINE CULTURE HOLD SAMPLE    Collection Time: 12/30/21  9:31 AM    Specimen: Serum; Urine   Result Value Ref Range    Urine culture hold        Urine on hold in Microbiology dept for 2 days. If unpreserved urine is submitted, it cannot be used for addtional testing after 24 hours, recollection will be required.    CBC WITH AUTOMATED DIFF    Collection Time: 12/30/21  9:31 AM   Result Value Ref Range    WBC 11.4 (H) 3.6 - 11.0 K/uL    RBC 5.18 3.80 - 5.20 M/uL    HGB 14.1 11.5 - 16.0 g/dL    HCT 44.1 35.0 - 47.0 %    MCV 85.1 80.0 - 99.0 FL    MCH 27.2 26.0 - 34.0 PG    MCHC 32.0 30.0 - 36.5 g/dL    RDW 13.2 11.5 - 14.5 %    PLATELET 074 682 - 302 K/uL    MPV 10.7 8.9 - 12.9 FL    NRBC 0.0 0  WBC    ABSOLUTE NRBC 0.00 0.00 - 0.01 K/uL    NEUTROPHILS 70 32 - 75 %    LYMPHOCYTES 23 12 - 49 %    MONOCYTES 6 5 - 13 %    EOSINOPHILS 0 0 - 7 %    BASOPHILS 1 0 - 1 %    IMMATURE GRANULOCYTES 0 0.0 - 0.5 %    ABS. NEUTROPHILS 7.9 1.8 - 8.0 K/UL    ABS. LYMPHOCYTES 2.6 0.8 - 3.5 K/UL    ABS. MONOCYTES 0.7 0.0 - 1.0 K/UL    ABS. EOSINOPHILS 0.0 0.0 - 0.4 K/UL    ABS. BASOPHILS 0.1 0.0 - 0.1 K/UL    ABS. IMM. GRANS. 0.0 0.00 - 0.04 K/UL    DF AUTOMATED     METABOLIC PANEL, COMPREHENSIVE    Collection Time: 12/30/21  9:31 AM   Result Value Ref Range    Sodium 136 136 - 145 mmol/L    Potassium 3.6 3.5 - 5.1 mmol/L    Chloride 104 97 - 108 mmol/L    CO2 27 21 - 32 mmol/L    Anion gap 5 5 - 15 mmol/L    Glucose 88 65 - 100 mg/dL    BUN 16 6 - 20 MG/DL    Creatinine 0.84 0.55 - 1.02 MG/DL    BUN/Creatinine ratio 19 12 - 20      GFR est AA >60 >60 ml/min/1.73m2    GFR est non-AA >60 >60 ml/min/1.73m2    Calcium 9.2 8.5 - 10.1 MG/DL    Bilirubin, total 0.6 0.2 - 1.0 MG/DL    ALT (SGPT) 17 12 - 78 U/L    AST (SGOT) 17 15 - 37 U/L    Alk. phosphatase 76 45 - 117 U/L    Protein, total 8.4 (H) 6.4 - 8.2 g/dL    Albumin 4.2 3.5 - 5.0 g/dL    Globulin 4.2 (H) 2.0 - 4.0 g/dL    A-G Ratio 1.0 (L) 1.1 - 2.2     SAMPLES BEING HELD    Collection Time: 12/30/21  9:31 AM   Result Value Ref Range    SAMPLES BEING HELD 1 extra red tube  extra grey top tube     COMMENT        Add-on orders for these samples will be processed based on acceptable specimen integrity and analyte stability, which may vary by analyte.    HCG URINE, QL. - POC    Collection Time: 12/30/21  9:31 AM   Result Value Ref Range    Pregnancy test,urine (POC) Negative NEG     LIPASE    Collection Time: 12/30/21  9:31 AM   Result Value Ref Range    Lipase 91 73 - 393 U/L       Imaging  CT ABD PELV W CONT (12/30/2021)     INDICATION: RUQ an d RLq pain     COMPARISON: None      CONTRAST: 100 mL of Isovue-370.     TECHNIQUE:   Following the uneventful intravenous administration of contrast, thin axial  images were obtained through the abdomen and pelvis. Coronal and sagittal  reconstructions were generated. Oral contrast was not administered. CT dose  reduction was achieved through use of a standardized protocol tailored for this  examination and automatic exposure control for dose modulation.     FINDINGS:   LOWER THORAX: No significant abnormality in the incidentally imaged lower chest.  LIVER: No mass. BILIARY TREE: Gallbladder is within normal limits. CBD is not dilated. SPLEEN: within normal limits. PANCREAS: No mass or ductal dilatation. ADRENALS: Unremarkable. KIDNEYS: Severe right-sided hydronephrosis. No stone. No left-sided  hydronephrosis  STOMACH: Unremarkable. SMALL BOWEL: No dilatation or wall thickening. COLON: No dilatation or wall thickening. APPENDIX: Not identified  PERITONEUM: No ascites or pneumoperitoneum. RETROPERITONEUM: No lymphadenopathy or aortic aneurysm. REPRODUCTIVE ORGANS: Uterus is not enlarged. 39.2 x 19.8 x 36.5 cm fluid density  mass arising from the pelvis extending nearly to the diaphragm. URINARY BLADDER: Compressed posterior to the pubis symphysis  BONES: No destructive bone lesion. ABDOMINAL WALL: No mass or hernia. ADDITIONAL COMMENTS: N/A     IMPRESSION     1. 39.2 x 19.8 x 36.5 cm fluid density mass arising from the pelvis nearly  filling the entire abdomen. 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 This results in severe right-sided hydronephrosis.  Consultation with Amrita Harley is  recommended      IMPRESSION/PLAN:    I reviewed with Jeannette Jeffrie Curling her medical records, physical exam, and review of symptoms. 27 y.o. female with complaints of abdominal pain. Large, 39 cm fluid filled cystic pelvic mass noted on CT scan. CT Scan reviewed with Dr. Tanika Ledesma. Based on appearance, this is hopefully a benign fluid filled cyst.  Abdominal pain likely related to large size of the mass and abdominal distention. Plan on exploratory laparotomy later today with resection of mass. Patient has been NPO since 8am.  Procedure explained to her in detail and she agrees to proceed. Signed By: Mando Steven PA-C     12/30/2021/2:01 PM       Patient seen and evaluated. Agree with preceding note. Images reviewed. Large, unilocular, cystic mass measuring 39 cm in greatest dimension, filling the entire abdomen and pelvis. Uterus displaced inferiorly. Bowel displaced superiorly. This is likely arising from one of her ovaries. The contralateral ovary is not visible. I reassured her that the mass is most likely a benign process, as she has no other concerning findings, such as ascites, carcinomatosis, and lymphadenopathy. I recommend surgical resection. Since my suspicion for malignancy is low, I don't feel that we have to remove the mass intact, as that would require a huge incision, and thus a longer recovery. I suggested that we make a small incision, drain the mass in a controlled fashion, then remove the mass and send it for pathology. We will plan to leave the uterus and contralateral ovary, unless malignancy is identified. She was counseled on the risks, benefits, indications and alternatives of the planned procedure. Her questions were answered to her satisfaction and she wishes to proceed as planned. An electronic signature was used to sign this note.     Rebecca Flanagan MD  12/30/21   3:30 PM

## 2021-12-30 NOTE — ANESTHESIA PREPROCEDURE EVALUATION
Relevant Problems   No relevant active problems       Anesthetic History   No history of anesthetic complications            Review of Systems / Medical History  Patient summary reviewed, nursing notes reviewed and pertinent labs reviewed    Pulmonary  Within defined limits                 Neuro/Psych   Within defined limits           Cardiovascular  Within defined limits                Exercise tolerance: >4 METS     GI/Hepatic/Renal  Within defined limits              Endo/Other        Morbid obesity     Other Findings              Physical Exam    Airway  Mallampati: II  TM Distance: 4 - 6 cm  Neck ROM: normal range of motion   Mouth opening: Normal     Cardiovascular    Rhythm: regular  Rate: normal         Dental    Dentition: Caps/crowns     Pulmonary  Breath sounds clear to auscultation               Abdominal  GI exam deferred       Other Findings   Comments: Disconjugate gaze          Anesthetic Plan    ASA: 3  Anesthesia type: general          Induction: Intravenous  Anesthetic plan and risks discussed with: Patient

## 2021-12-30 NOTE — ED NOTES
TRANSFER - OUT REPORT:    Verbal report given to Denise FRANCE(name) on [facility]  being transferred to OR(unit) for ordered procedure       Report consisted of patients Situation, Background, Assessment and   Recommendations(SBAR). Information from the following report(s) SBAR, ED Summary and Recent Results was reviewed with the receiving nurse. Lines:   Peripheral IV 12/30/21 Left Arm (Active)   Site Assessment Clean, dry, & intact 12/30/21 0930   Phlebitis Assessment 0 12/30/21 0930   Infiltration Assessment 0 12/30/21 0930   Dressing Status Clean, dry, & intact 12/30/21 0930   Dressing Type Transparent 12/30/21 0930   Hub Color/Line Status Pink 12/30/21 0930   Action Taken Blood drawn 12/30/21 0930   Alcohol Cap Used Yes 12/30/21 0930        Opportunity for questions and clarification was provided.       Patient transported with:   [employer]

## 2021-12-31 VITALS
DIASTOLIC BLOOD PRESSURE: 74 MMHG | HEIGHT: 63 IN | RESPIRATION RATE: 16 BRPM | OXYGEN SATURATION: 99 % | WEIGHT: 242.51 LBS | BODY MASS INDEX: 42.97 KG/M2 | TEMPERATURE: 97.8 F | HEART RATE: 77 BPM | SYSTOLIC BLOOD PRESSURE: 119 MMHG

## 2021-12-31 LAB
ANION GAP SERPL CALC-SCNC: 5 MMOL/L (ref 5–15)
BUN SERPL-MCNC: 12 MG/DL (ref 6–20)
BUN/CREAT SERPL: 16 (ref 12–20)
CALCIUM SERPL-MCNC: 8.3 MG/DL (ref 8.5–10.1)
CHLORIDE SERPL-SCNC: 108 MMOL/L (ref 97–108)
CO2 SERPL-SCNC: 24 MMOL/L (ref 21–32)
CREAT SERPL-MCNC: 0.73 MG/DL (ref 0.55–1.02)
ERYTHROCYTE [DISTWIDTH] IN BLOOD BY AUTOMATED COUNT: 12.9 % (ref 11.5–14.5)
GLUCOSE SERPL-MCNC: 126 MG/DL (ref 65–100)
HCT VFR BLD AUTO: 35.3 % (ref 35–47)
HGB BLD-MCNC: 11.5 G/DL (ref 11.5–16)
MCH RBC QN AUTO: 27.3 PG (ref 26–34)
MCHC RBC AUTO-ENTMCNC: 32.6 G/DL (ref 30–36.5)
MCV RBC AUTO: 83.8 FL (ref 80–99)
NRBC # BLD: 0 K/UL (ref 0–0.01)
NRBC BLD-RTO: 0 PER 100 WBC
PLATELET # BLD AUTO: 331 K/UL (ref 150–400)
PMV BLD AUTO: 10 FL (ref 8.9–12.9)
POTASSIUM SERPL-SCNC: 3.9 MMOL/L (ref 3.5–5.1)
RBC # BLD AUTO: 4.21 M/UL (ref 3.8–5.2)
SODIUM SERPL-SCNC: 137 MMOL/L (ref 136–145)
WBC # BLD AUTO: 16.1 K/UL (ref 3.6–11)

## 2021-12-31 PROCEDURE — 36415 COLL VENOUS BLD VENIPUNCTURE: CPT

## 2021-12-31 PROCEDURE — 80048 BASIC METABOLIC PNL TOTAL CA: CPT

## 2021-12-31 PROCEDURE — 74011250637 HC RX REV CODE- 250/637: Performed by: OBSTETRICS & GYNECOLOGY

## 2021-12-31 PROCEDURE — 74011250636 HC RX REV CODE- 250/636: Performed by: OBSTETRICS & GYNECOLOGY

## 2021-12-31 PROCEDURE — 85027 COMPLETE CBC AUTOMATED: CPT

## 2021-12-31 RX ADMIN — SODIUM CHLORIDE 100 ML/HR: 900 INJECTION, SOLUTION INTRAVENOUS at 04:10

## 2021-12-31 RX ADMIN — OXYCODONE 5 MG: 5 TABLET ORAL at 08:19

## 2021-12-31 RX ADMIN — KETOROLAC TROMETHAMINE 30 MG: 30 INJECTION, SOLUTION INTRAMUSCULAR; INTRAVENOUS at 06:52

## 2021-12-31 RX ADMIN — OXYCODONE 5 MG: 5 TABLET ORAL at 01:35

## 2021-12-31 RX ADMIN — ACETAMINOPHEN 650 MG: 325 TABLET ORAL at 06:52

## 2021-12-31 NOTE — PROGRESS NOTES
9871: Bedside and Verbal shift change report given to HOA (oncoming nurse) by KEILA James (offgoing nurse). Report included the following information SBAR, Kardex, Procedure Summary, Intake/Output, MAR, Accordion and Recent Results. 1027: I have reviewed discharge instructions with the patient. The patient verbalized understanding. Discharge medications reviewed with patient and appropriate educational materials and side effects teaching were provided. Postop teachings reviewed and given to pt.

## 2021-12-31 NOTE — PROGRESS NOTES
[address] 32 December 31, 2021       RE: Fabi Ashton      To Whom It May Concern,    This is to certify that Fabi Ashton was hospitalized from 12/30/2021 - 12/31/2021. Per Dr. Dandre Potter:  -No heavy lifting over 15 lbs for 8 weeks  -No driving for 2 weeks    Thank you for your assistance in this matter.       Sincerely,  Jeanette Mayfield RN

## 2021-12-31 NOTE — PROGRESS NOTES
Bedside and Verbal shift change report given to Javier Burns RN (oncoming nurse) by Marie Parra RN (offgoing nurse). Report included the following information SBAR, Kardex, ED Summary, OR Summary, Procedure Summary, Intake/Output, MAR, Accordion and Recent Results.

## 2021-12-31 NOTE — PROGRESS NOTES
[facility]  [address], [address], [address]  P [phone]  F [phone]       Patient Name: Celi Sanchez   Admit Date: 12/30/2021   OR Date: 12/30/2021   Visit Date: 12/31/2021        SUBJECTIVE:    No complaints this morning, aside from some gas pain. Ambulating. Voiding without difficulty. OBJECTIVE:    Patient Vitals for the past 24 hrs:   Temp Pulse Resp BP SpO2   12/31/21 0811 97.8 °F (36.6 °C) 77 16 119/74 99 %   12/31/21 0410 98 °F (36.7 °C) 95 18 139/78 100 %   12/31/21 0003 97.9 °F (36.6 °C) 71 18 129/76 100 %   12/30/21 2139 97.5 °F (36.4 °C) 76 18 128/77 97 %   12/30/21 2039 97.9 °F (36.6 °C) 84 18 136/79 97 %   12/30/21 1939 97.9 °F (36.6 °C) 72 16 126/76 95 %   12/30/21 1839 97.8 °F (36.6 °C) 88 16 132/87 99 %   12/30/21 1825  66 11  95 %   12/30/21 1820  78 14  98 %   12/30/21 1815  65 11 131/68 98 %   12/30/21 1800 98.2 °F (36.8 °C) 84 20 137/77 98 %   12/30/21 1755  85 14  100 %   12/30/21 1750  82 14  100 %   12/30/21 1745  77 14 127/81 100 %   12/30/21 1730  87 18 133/73 100 %   12/30/21 1729 98.1 °F (36.7 °C) 87 16 132/82 100 %   12/30/21 1727 98.1 °F (36.7 °C) 87 16 132/82 100 %   12/30/21 1528 98.4 °F (36.9 °C) 97 20 (!) 143/109 100 %   12/30/21 0904 98 °F (36.7 °C) 98 20 (!) 151/104 98 %       Date 12/30/21 0700 - 12/31/21 0659 12/31/21 0700 - 01/01/22 0659   Shift 0700-1859 1900-0659 24 Hour Total 7923-5613 1736-6701 24 Hour Total   INTAKE   P.O. 60  60        P. O. 60  60      I. V.(mL/kg/hr) 1000(0.8)  1000(0.4)        I.V. 250  250        Volume (lactated Ringers infusion) 750  750      Shift Total(mL/kg) 1060(9.6)  1060(9.6)      OUTPUT   Urine(mL/kg/hr)  510(0.4) 510(0.2)        Urine Voided  510 510      Other 39260  52066        Other Output 25182  42803      Blood 5  5        Estimated Blood Loss 5  5      Shift Total(mL/kg) 08992(153.6) 510(4.6) 77687(132.0)      NET -13809 -510 -22348      Weight (kg) 110 110 110 110 110 110 Physical Exam     General:  alert, cooperative, no distress     Cardiac:  Regular rate and rhythm        Lungs:  clear to auscultation bilaterally  Abdomen:  soft, non-tender, without masses or organomegaly       Wound:  clean, dry, no drainage   Extremity: extremities normal, atraumatic, no cyanosis or edema    Data Review  Lab Results   Component Value Date/Time    WBC 16.1 (H) 12/31/2021 04:13 AM    ABS. NEUTROPHILS 7.9 12/30/2021 09:31 AM    HGB 11.5 12/31/2021 04:13 AM    HCT 35.3 12/31/2021 04:13 AM    MCV 83.8 12/31/2021 04:13 AM    MCH 27.3 12/31/2021 04:13 AM    PLATELET 927 96/21/4466 04:13 AM     Lab Results   Component Value Date/Time    Sodium 137 12/31/2021 04:13 AM    Potassium 3.9 12/31/2021 04:13 AM    Chloride 108 12/31/2021 04:13 AM    CO2 24 12/31/2021 04:13 AM    Glucose 126 (H) 12/31/2021 04:13 AM    BUN 12 12/31/2021 04:13 AM    Creatinine 0.73 12/31/2021 04:13 AM    Calcium 8.3 (L) 12/31/2021 04:13 AM    Albumin 4.2 12/30/2021 09:31 AM    Bilirubin, total 0.6 12/30/2021 09:31 AM    ALT (SGPT) 17 12/30/2021 09:31 AM    Alk. phosphatase 76 12/30/2021 09:31 AM     IMPRESSION/PLAN:    1 Day Post-Op Procedure(s):  LAPAROTOMY EXPLORATORY/ RESECTION MASS for left ovarian mass    Oncologic:  Large benign mass. No evidence of malignancy. Heme/CV:  Hemodynamically stable. Renal:  Good UOP. Normal creatinine. FEN/GI:  Diet as tolerated. ID/Wound:  Afebrile. PPX:  Ambulation. Dispostion:  Doing well postoperatively. OK for discharge today.           Patrice Seay MD  12/31/2021  9:00 AM

## 2022-01-03 NOTE — DISCHARGE SUMMARY
Discharge Summary     Patient: Comfort Christopher MRN: [id number]  SSN: [id number]    YOB: 1995  Age: 27 y.o. Sex: female       Admit Date: 12/30/2021    Discharge Date: 1/3/2022      Admission Diagnoses: Pelvic mass [R19.00]    Discharge Diagnoses:   Problem List as of 12/31/2021 Never Reviewed          Codes Class Noted - Resolved    Pelvic mass ICD-10-CM: R19.00  ICD-9-CM: 789.30  12/30/2021 - Present               Discharge Condition: Good    Hospital Course: 28 yo WF who presented to the ER with a large, cystic, abdominopelvic mass. She was taken to the OR immediately for surgical resection. Due to the high likelihood of the mass being benign, based upon its appearance, rather than making a huge vertical incision, I opted to make a small Pfannenstiel incision and drain the mass so that it could be removed. The mass contained clear fluid and did not have any adhesions in the upper abdomen. The mass appeared to be benign, as it had no solid components. Postoperatively she did well and was felt to be ready for discharge on POD #1. Consults: None    Significant Diagnostic Studies:     CT of abdomen/pelvis (12/30/21)  LOWER THORAX: No significant abnormality in the incidentally imaged lower chest.  LIVER: No mass. BILIARY TREE: Gallbladder is within normal limits. CBD is not dilated. SPLEEN: within normal limits. PANCREAS: No mass or ductal dilatation. ADRENALS: Unremarkable. KIDNEYS: Severe right-sided hydronephrosis. No stone. No left-sided  hydronephrosis  STOMACH: Unremarkable. SMALL BOWEL: No dilatation or wall thickening. COLON: No dilatation or wall thickening. APPENDIX: Not identified  PERITONEUM: No ascites or pneumoperitoneum. RETROPERITONEUM: No lymphadenopathy or aortic aneurysm. REPRODUCTIVE ORGANS: Uterus is not enlarged. 39.2 x 19.8 x 36.5 cm fluid density  mass arising from the pelvis extending nearly to the diaphragm.   URINARY BLADDER: Compressed posterior to the pubis symphysis  BONES: No destructive bone lesion. ABDOMINAL WALL: No mass or hernia. ADDITIONAL COMMENTS: N/A     IMPRESSION  1. 39.2 x 19.8 x 36.5 cm fluid density mass arising from the pelvis nearly  filling the entire abdomen. 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 This results in severe right-sided hydronephrosis. Consultation with Meghana Miles is  recommended      Disposition: home    Discharge Medications:   Cannot display discharge medications since this patient is not currently admitted.       Activity: Activity as tolerated  Diet: Regular Diet  Wound Care: Keep wound clean and dry    Follow-up Appointments   Procedures    FOLLOW UP VISIT Appointment in: One Month     Standing Status:   Standing     Number of Occurrences:   1     Order Specific Question:   Appointment in     Answer:   One Month       Signed By: Deirdre Byrd MD     January 3, 2022

## 2022-01-17 ENCOUNTER — TELEPHONE (OUTPATIENT)
Dept: GYNECOLOGY | Age: 27
End: 2022-01-17

## 2022-01-17 NOTE — TELEPHONE ENCOUNTER
Patient complains of low back pain that goes away once she lies down, dizziness upon standing, and a pinching pain just below her ribs. She states the pain on her right side below her ribs was there prior to surgery. I advised her that is most likely not related. She denies fever, chills, bowel, and bladder issues. She says she is well hydrated. Shagufta Mendoza

## 2022-01-18 NOTE — TELEPHONE ENCOUNTER
Called the patient, left a message that he advised for her to schedule an appointment and to call back if she would like to come in earlier then next Thursday.

## 2022-01-27 ENCOUNTER — OFFICE VISIT (OUTPATIENT)
Dept: GYNECOLOGY | Age: 27
End: 2022-01-27

## 2022-01-27 VITALS
WEIGHT: 205 LBS | BODY MASS INDEX: 36.32 KG/M2 | SYSTOLIC BLOOD PRESSURE: 119 MMHG | HEART RATE: 105 BPM | DIASTOLIC BLOOD PRESSURE: 80 MMHG | HEIGHT: 63 IN

## 2022-01-27 DIAGNOSIS — R19.00 PELVIC MASS: Primary | ICD-10-CM

## 2022-01-27 PROCEDURE — 99024 POSTOP FOLLOW-UP VISIT: CPT | Performed by: OBSTETRICS & GYNECOLOGY

## 2022-01-27 NOTE — PROGRESS NOTES
[address] [facility] [address], [address] [facility]  P [phone]  F [phone]    Postoperative Office Note  Patient ID:  Name: Nilton Spivey  MRM: [id number]  : 26 y.o. Date: 2022    Diagnosis:     ICD-10-CM ICD-9-CM    1. Pelvic mass  R19.00 789.30        Problem List:   Patient Active Problem List   Diagnosis Code    Pelvic mass R19.00       SUBJECTIVE:    Nilton Spivey is a 27 y.o. female who presents for followup postoperative care following exploratory laparotomy, resection of mass, left salpingooophorectomy. Operative findings:  Large, unilocular, cystic mass arising from the left ovary. The mass extended from the pelvic floor upward to the level of the liver, filling the entire abdomen. There were no adhesions of the mass to any other structures in the abdomen, except for a couple of small band-like adhesions in the pelvis. The mass was drained in order to remove it through a Pfannenstiel incision. The fluid was clear and serous. A total volume of 16.35 liters was drained. The cystic mass was opened on the back table and inspected. The interior and exterior surfaces were both completely smooth and without excrescences. There was nothing suspicious for malignancy. The uterus and contralateral tube and ovary appeared normal.     The patient's pathology revealed: FINAL PATHOLOGIC DIAGNOSIS   Left adnexal mass, excision:   Benign serous cystadenoma   Benign fallopian tube with paratubal cysts and focal acute and chronic inflammation     Currently she has no problems with eating, bowel movements, voiding, or their wound. Appetite is good. Eating a regular diet without difficulty. Bowel movements are regular. The patient is not having any pain.     Medications:     Current Outpatient Medications on File Prior to Visit   Medication Sig Dispense Refill    aspirin-acetaminophen-caffeine (EXCEDRIN ES) 250-250-65 mg per tablet Take 1 Tablet by mouth.      ibuprofen (MOTRIN) 600 mg tablet Take 1 Tablet by mouth every eight (8) hours as needed for Pain. (Patient not taking: Reported on 1/27/2022) 40 Tablet 0    docusate sodium (COLACE) 100 mg capsule Take 1 Capsule by mouth daily for 30 days. Stop taking if you develop loose stools (Patient not taking: Reported on 1/27/2022) 30 Capsule 0     No current facility-administered medications on file prior to visit. Allergies:   No Known Allergies  History reviewed. No pertinent past medical history. History reviewed. No pertinent surgical history. Social History     Socioeconomic History    Marital status: SINGLE     Spouse name: Not on file    Number of children: Not on file    Years of education: Not on file    Highest education level: Not on file   Occupational History    Not on file   Tobacco Use    Smoking status: Never Smoker    Smokeless tobacco: Never Used   Substance and Sexual Activity    Alcohol use: Not on file    Drug use: Not on file    Sexual activity: Not on file   Other Topics Concern    Not on file   Social History Narrative    Not on file     Social Determinants of Health     Financial Resource Strain:     Difficulty of Paying Living Expenses: Not on file   Food Insecurity:     Worried About Running Out of Food in the Last Year: Not on file    Nicko of Food in the Last Year: Not on file   Transportation Needs:     Lack of Transportation (Medical): Not on file    Lack of Transportation (Non-Medical):  Not on file   Physical Activity:     Days of Exercise per Week: Not on file    Minutes of Exercise per Session: Not on file   Stress:     Feeling of Stress : Not on file   Social Connections:     Frequency of Communication with Friends and Family: Not on file    Frequency of Social Gatherings with Friends and Family: Not on file    Attends Taoism Services: Not on file    Active Member of Clubs or Organizations: Not on file    Attends Club or Organization Meetings: Not on file    Marital Status: Not on file   Intimate Partner Violence:     Fear of Current or Ex-Partner: Not on file    Emotionally Abused: Not on file    Physically Abused: Not on file    Sexually Abused: Not on file   Housing Stability:     Unable to Pay for Housing in the Last Year: Not on file    Number of [place] in the Last Year: Not on file    Unstable Housing in the Last Year: Not on file       OBJECTIVE:    Vitals:   Vitals:    01/27/22 1358   BP: 119/80   Pulse: (!) 105   Weight: 205 lb (93 kg)   Height: 5' 2.99\" (1.6 m)     Physical Examination:     General:  alert, cooperative, no distress   Lungs: lungs clear to auscultation   Cardiac: Regular rate and rhythm   Abdomen: soft, bowel sounds active, non-tender  No CVA tenderness   Incision: healing well   Pelvic: Exam deferred. Rectal: not done   Extremity:   extremities normal, atraumatic, no cyanosis or edema     IMPRESSION:    Osei Brewer is doing well postoperatively. She has no evidence of malignancy and no apparent complications from surgery. Medical problems:     Patient Active Problem List   Diagnosis Code    Pelvic mass R19.00       PLAN:    The operative procedures and clinical results have been reviewed with the patient. Implications of diagnosis discussed at length. All questions answered. No additional treatment or evaluation is necessary. The patient may return to all of her normal activities without restrictions in 2 weeks. I will see the patient back prn. The patient is advised to call our office with any problems or concerns. An electronic signature was used to sign this note.     Leeann John MD  1/27/2022

## 2022-03-19 PROBLEM — R19.00 PELVIC MASS: Status: ACTIVE | Noted: 2021-12-30

## 2023-05-23 ENCOUNTER — OFFICE VISIT (OUTPATIENT)
Age: 28
End: 2023-05-23

## 2023-05-23 VITALS — HEIGHT: 63 IN | WEIGHT: 282 LBS | BODY MASS INDEX: 49.96 KG/M2

## 2023-05-23 DIAGNOSIS — R10.84 GENERALIZED ABDOMINAL PAIN: Primary | ICD-10-CM

## 2023-05-23 PROCEDURE — 99213 OFFICE O/P EST LOW 20 MIN: CPT | Performed by: OBSTETRICS & GYNECOLOGY

## 2023-05-23 NOTE — PROGRESS NOTES
Patient continues to have pain in her upper abdomen. She said  advised her to return if it continued.

## 2023-05-23 NOTE — PROGRESS NOTES
[address] [place] [address] [address], [address] [facility]  P [phone]  F [phone]    Office Note  Patient ID:  Name:  Flaquito Altamirano  MRN:  [id number]  :  27 y.o. Date:  2023      HISTORY OF PRESENT ILLNESS:  Flaquito Altamirano is a 32 y.o.  premenopausal female who is an established patient that I met as an ER consult in 2021. At that time she was found to have a large, cystic pelvic mass on CT measuring 39 cm. I was able to resect the mass and remove it through a Pfannenstiel incision, though I had to drain it intraoperatively in order to do so. Operative findings:  Large, unilocular, cystic mass arising from the left ovary. The mass extended from the pelvic floor upward to the level of the liver, filling the entire abdomen. There were no adhesions of the mass to any other structures in the abdomen, except for a couple of small band-like adhesions in the pelvis. The mass was drained in order to remove it through a Pfannenstiel incision. The fluid was clear and serous. A total volume of 16.35 liters was drained. The cystic mass was opened on the back table and inspected. The interior and exterior surfaces were both completely smooth and without excrescences. There was nothing suspicious for malignancy. The uterus and contralateral tube and ovary appeared normal.     FINAL PATHOLOGIC DIAGNOSIS   Left adnexal mass, excision:   Benign serous cystadenoma   Benign fallopian tube with paratubal cysts and focal acute and   chronic inflammation    She presents today complaining of abdominal pain. It is mostly upper abdominal, primarily epigastric, but does travel downward, more so on the right. She does not report nausea/vomiting or constipation/diarrhea.           ROS:   and GI review:  Negative  Cardiopulmonary review:  Negative   Musculoskeletal:  Negative    A comprehensive review of systems was negative except for that written in the History

## 2023-08-14 ENCOUNTER — HOSPITAL ENCOUNTER (OUTPATIENT)
Facility: HOSPITAL | Age: 28
Discharge: HOME | End: 2023-08-17
Attending: OBSTETRICS & GYNECOLOGY
Payer: COMMERCIAL

## 2023-08-14 DIAGNOSIS — R10.84 GENERALIZED ABDOMINAL PAIN: ICD-10-CM

## 2023-08-14 PROCEDURE — 6360000004 HC RX CONTRAST MEDICATION: Performed by: RADIOLOGY

## 2023-08-14 PROCEDURE — 74177 CT ABD & PELVIS W/CONTRAST: CPT

## 2023-08-14 RX ADMIN — IOPAMIDOL 100 ML: 755 INJECTION, SOLUTION INTRAVENOUS at 11:20

## 2023-08-22 ENCOUNTER — TELEPHONE (OUTPATIENT)
Age: 28
End: 2023-08-22

## 2023-08-22 ENCOUNTER — TELEMEDICINE (OUTPATIENT)
Age: 28
End: 2023-08-22
Payer: COMMERCIAL

## 2023-08-22 DIAGNOSIS — R10.84 GENERALIZED ABDOMINAL PAIN: Primary | ICD-10-CM

## 2023-08-22 PROCEDURE — 99213 OFFICE O/P EST LOW 20 MIN: CPT | Performed by: OBSTETRICS & GYNECOLOGY

## 2023-08-22 NOTE — PROGRESS NOTES
Virtual visit. Follow up to review ct scan results.
Never    Smokeless tobacco: Never   Substance and Sexual Activity    Alcohol use: Not on file    Drug use: Not on file    Sexual activity: Not on file   Other Topics Concern    Not on file   Social History Narrative    Not on file     Social Determinants of Health     Financial Resource Strain: Not on file   Food Insecurity: Not on file   Transportation Needs: Not on file   Physical Activity: Not on file   Stress: Not on file   Social Connections: Not on file   Intimate Partner Violence: Not on file   Housing Stability: Not on file       Review of Systems        Objective:   No flowsheet data found. General: alert, cooperative, no distress   Mental  status: normal mood, behavior, speech, dress, motor activity, and thought processes, able to follow commands   HENT: NCAT   Neck: no visualized mass   Resp: no respiratory distress   Neuro: no gross deficits   Skin: no discoloration or lesions of concern on visible areas   Psychiatric: normal affect, consistent with stated mood, no evidence of hallucinations     Additional exam findings: We discussed the expected course, resolution and complications of the diagnosis(es) in detail. Medication risks, benefits, costs, interactions, and alternatives were discussed as indicated. I advised her to contact the office if her condition worsens, changes or fails to improve as anticipated. She expressed understanding with the diagnosis(es) and plan. Chito Lugo, who was evaluated through a patient-initiated, synchronous (real-time) audio-video encounter, and/or her healthcare decision maker, is aware that it is a billable service, with coverage as determined by her insurance carrier. She provided verbal consent to proceed: Yes, and patient identification was verified.  It was conducted pursuant to the emergency declaration under the [place] [address] waiver authority and the [employer] and [employer]

## 2023-08-22 NOTE — TELEPHONE ENCOUNTER
Yazmin just got off a virtual appointment with Dr Delvin Medina and he wants her to see a gastroenterology.  Please call her

## 2023-08-23 ENCOUNTER — TELEPHONE (OUTPATIENT)
Age: 28
End: 2023-08-23

## 2024-01-25 ENCOUNTER — HOSPITAL ENCOUNTER (OUTPATIENT)
Facility: HOSPITAL | Age: 29
Discharge: HOME | End: 2024-01-25
Attending: INTERNAL MEDICINE
Payer: COMMERCIAL

## 2024-01-25 DIAGNOSIS — R10.11 RUQ ABDOMINAL PAIN: ICD-10-CM

## 2024-01-25 PROCEDURE — 76705 ECHO EXAM OF ABDOMEN: CPT

## 2024-08-22 ENCOUNTER — APPOINTMENT (OUTPATIENT)
Facility: HOSPITAL | Age: 29
End: 2024-08-22
Payer: COMMERCIAL

## 2024-08-22 ENCOUNTER — HOSPITAL ENCOUNTER (EMERGENCY)
Facility: HOSPITAL | Age: 29
Discharge: HOME | End: 2024-08-22
Attending: EMERGENCY MEDICINE
Payer: COMMERCIAL

## 2024-08-22 VITALS
RESPIRATION RATE: 16 BRPM | BODY MASS INDEX: 46.99 KG/M2 | SYSTOLIC BLOOD PRESSURE: 122 MMHG | TEMPERATURE: 97.5 F | HEART RATE: 82 BPM | WEIGHT: 265.21 LBS | DIASTOLIC BLOOD PRESSURE: 84 MMHG | OXYGEN SATURATION: 99 %

## 2024-08-22 DIAGNOSIS — J40 BRONCHITIS: Primary | ICD-10-CM

## 2024-08-22 DIAGNOSIS — R11.2 NAUSEA AND VOMITING, UNSPECIFIED VOMITING TYPE: ICD-10-CM

## 2024-08-22 LAB
ALBUMIN SERPL-MCNC: 3.9 G/DL (ref 3.5–5)
ALBUMIN/GLOB SERPL: 0.9 (ref 1.1–2.2)
ALP SERPL-CCNC: 90 U/L (ref 45–117)
ALT SERPL-CCNC: 27 U/L (ref 12–78)
ANION GAP SERPL CALC-SCNC: 8 MMOL/L (ref 5–15)
AST SERPL-CCNC: 24 U/L (ref 15–37)
BASOPHILS # BLD: 0.1 K/UL (ref 0–0.1)
BASOPHILS NFR BLD: 1 % (ref 0–1)
BILIRUB SERPL-MCNC: 0.3 MG/DL (ref 0.2–1)
BUN SERPL-MCNC: 9 MG/DL (ref 6–20)
BUN/CREAT SERPL: 9 (ref 12–20)
CALCIUM SERPL-MCNC: 9.5 MG/DL (ref 8.5–10.1)
CHLORIDE SERPL-SCNC: 107 MMOL/L (ref 97–108)
CO2 SERPL-SCNC: 23 MMOL/L (ref 21–32)
COMMENT:: NORMAL
CREAT SERPL-MCNC: 0.95 MG/DL (ref 0.55–1.02)
DIFFERENTIAL METHOD BLD: ABNORMAL
EKG ATRIAL RATE: 96 BPM
EKG DIAGNOSIS: NORMAL
EKG P AXIS: 59 DEGREES
EKG P-R INTERVAL: 132 MS
EKG Q-T INTERVAL: 342 MS
EKG QRS DURATION: 82 MS
EKG QTC CALCULATION (BAZETT): 432 MS
EKG R AXIS: 63 DEGREES
EKG T AXIS: 8 DEGREES
EKG VENTRICULAR RATE: 96 BPM
EOSINOPHIL # BLD: 0 K/UL (ref 0–0.4)
EOSINOPHIL NFR BLD: 0 % (ref 0–7)
ERYTHROCYTE [DISTWIDTH] IN BLOOD BY AUTOMATED COUNT: 16.7 % (ref 11.5–14.5)
GLOBULIN SER CALC-MCNC: 4.3 G/DL (ref 2–4)
GLUCOSE SERPL-MCNC: 122 MG/DL (ref 65–100)
HCT VFR BLD AUTO: 41.7 % (ref 35–47)
HGB BLD-MCNC: 12.9 G/DL (ref 11.5–16)
IMM GRANULOCYTES # BLD AUTO: 0 K/UL (ref 0–0.04)
IMM GRANULOCYTES NFR BLD AUTO: 0 % (ref 0–0.5)
LYMPHOCYTES # BLD: 2 K/UL (ref 0.8–3.5)
LYMPHOCYTES NFR BLD: 17 % (ref 12–49)
MCH RBC QN AUTO: 24.3 PG (ref 26–34)
MCHC RBC AUTO-ENTMCNC: 30.9 G/DL (ref 30–36.5)
MCV RBC AUTO: 78.7 FL (ref 80–99)
MONOCYTES # BLD: 1.2 K/UL (ref 0–1)
MONOCYTES NFR BLD: 10 % (ref 5–13)
NEUTS SEG # BLD: 8.5 K/UL (ref 1.8–8)
NEUTS SEG NFR BLD: 72 % (ref 32–75)
NRBC # BLD: 0 K/UL (ref 0–0.01)
NRBC BLD-RTO: 0 PER 100 WBC
PLATELET # BLD AUTO: 320 K/UL (ref 150–400)
PMV BLD AUTO: 10.1 FL (ref 8.9–12.9)
POTASSIUM SERPL-SCNC: 3.1 MMOL/L (ref 3.5–5.1)
PROT SERPL-MCNC: 8.2 G/DL (ref 6.4–8.2)
RBC # BLD AUTO: 5.3 M/UL (ref 3.8–5.2)
SARS-COV-2 RNA RESP QL NAA+PROBE: NOT DETECTED
SODIUM SERPL-SCNC: 138 MMOL/L (ref 136–145)
SOURCE: NORMAL
SPECIMEN HOLD: NORMAL
WBC # BLD AUTO: 11.9 K/UL (ref 3.6–11)

## 2024-08-22 PROCEDURE — 71046 X-RAY EXAM CHEST 2 VIEWS: CPT

## 2024-08-22 PROCEDURE — 93005 ELECTROCARDIOGRAM TRACING: CPT | Performed by: EMERGENCY MEDICINE

## 2024-08-22 PROCEDURE — 87635 SARS-COV-2 COVID-19 AMP PRB: CPT

## 2024-08-22 PROCEDURE — 85025 COMPLETE CBC W/AUTO DIFF WBC: CPT

## 2024-08-22 PROCEDURE — 80053 COMPREHEN METABOLIC PANEL: CPT

## 2024-08-22 PROCEDURE — 36415 COLL VENOUS BLD VENIPUNCTURE: CPT

## 2024-08-22 PROCEDURE — 99285 EMERGENCY DEPT VISIT HI MDM: CPT

## 2024-08-22 RX ORDER — ONDANSETRON 4 MG/1
4 TABLET, FILM COATED ORAL 3 TIMES DAILY PRN
Qty: 15 TABLET | Refills: 0 | Status: SHIPPED | OUTPATIENT
Start: 2024-08-22

## 2024-08-22 RX ORDER — AZITHROMYCIN 250 MG/1
TABLET, FILM COATED ORAL
Qty: 6 TABLET | Refills: 0 | Status: SHIPPED | OUTPATIENT
Start: 2024-08-22 | End: 2024-09-01

## 2024-08-22 RX ORDER — FAMOTIDINE 20 MG/1
20 TABLET, FILM COATED ORAL 2 TIMES DAILY
Qty: 60 TABLET | Refills: 0 | Status: SHIPPED | OUTPATIENT
Start: 2024-08-22

## 2024-08-22 ASSESSMENT — ENCOUNTER SYMPTOMS
SINUS PRESSURE: 0
NAUSEA: 0
BLOOD IN STOOL: 0
VOMITING: 1
SORE THROAT: 0
SHORTNESS OF BREATH: 1
COUGH: 0
RHINORRHEA: 0
SINUS PAIN: 0
COLOR CHANGE: 0
ABDOMINAL PAIN: 0
WHEEZING: 0
DIARRHEA: 0
TROUBLE SWALLOWING: 0
STRIDOR: 0
CHEST TIGHTNESS: 0
EYES NEGATIVE: 1
BACK PAIN: 0

## 2024-08-22 ASSESSMENT — PAIN - FUNCTIONAL ASSESSMENT: PAIN_FUNCTIONAL_ASSESSMENT: 0-10

## 2024-08-22 ASSESSMENT — PAIN SCALES - GENERAL: PAINLEVEL_OUTOF10: 0

## 2024-08-22 NOTE — ED TRIAGE NOTES
Pt ambulatory to triage co lost voice, SOB, chills, nausea, and spitting up blood over the past week. States that when she stood up earlier this morning she stood up, felt dizzy and threw up. States that it felt like the room was spinning.Dizziness has currently resolved.     Pt states everyone at work is sick. Pt took two at home covid tests and were both negative.     PMH: Fatty liver

## 2024-08-22 NOTE — DISCHARGE INSTRUCTIONS
He will need to drink plenty of fluids and stay well-hydrated.  Medication as directed for nausea and vomiting.  Given your mild medication to 4 possible gastritis with the blood that she vomited up this morning.  Will also give an antibiotic to cover the upper respiratory symptoms.  Follow-up with your own physician if continued difficulty

## 2024-08-22 NOTE — ED PROVIDER NOTES
and regular rhythm.      Pulses: Normal pulses.      Heart sounds: Normal heart sounds.   Pulmonary:      Effort: Pulmonary effort is normal. No respiratory distress.      Breath sounds: Normal breath sounds. No wheezing, rhonchi or rales.   Abdominal:      General: Abdomen is flat. Bowel sounds are normal.      Palpations: Abdomen is soft.      Tenderness: There is no abdominal tenderness. There is no guarding.   Musculoskeletal:         General: No swelling or tenderness.      Cervical back: Normal range of motion. No rigidity or tenderness.   Skin:     General: Skin is warm and dry.      Capillary Refill: Capillary refill takes 2 to 3 seconds.      Coloration: Skin is not pale.   Neurological:      General: No focal deficit present.      Mental Status: She is alert and oriented to person, place, and time.      Cranial Nerves: No cranial nerve deficit.      Sensory: No sensory deficit.      Motor: No weakness.      Coordination: Coordination normal.   Psychiatric:         Behavior: Behavior normal.         DIAGNOSTIC RESULTS     EKG:         RADIOLOGY:   No acute process appreciated.        XR CHEST (2 VW)   Final Result      No acute process.         Electronically signed by Jose C Marsh           LABS:  Labs Reviewed   EXTRA TUBES HOLD   CBC WITH AUTO DIFFERENTIAL   COMPREHENSIVE METABOLIC PANEL       All other labs were within normal range or not returned as of this dictation.    EMERGENCY DEPARTMENT COURSE and DIFFERENTIAL DIAGNOSIS/MDM:   Vitals:    Vitals:    08/22/24 0640   BP: 133/83   Pulse: (!) 104   Resp: 18   Temp: 97.5 °F (36.4 °C)   TempSrc: Oral   SpO2: 97%   Weight: 120.3 kg (265 lb 3.4 oz)           Medical Decision Making  This is a 28-year-old female who has a weeklong history of difficulty with her voice, some intermittent cough, intermittent shortness of breath, just not feeling well, and occasional dizziness.  Patient voices no other acute complaint.  She has had 2 negative COVID test and

## 2025-07-29 ENCOUNTER — TELEPHONE (OUTPATIENT)
Age: 30
End: 2025-07-29

## 2025-07-29 NOTE — TELEPHONE ENCOUNTER
Placed second outbound call to patient after receiving second notification via Epic that patient was ready to proceed but only 5 minutes had passed from previous phone call. Patient answered, advised patient seminar video is about 20 minutes long, failure to not complete the seminar in its entirety may cause the appointment to be cancelled or rescheduled, advised patient once seminar video is completed she can fill out new bariatric paperwork and return it to email address listed in email. Asked patient if she had any questions, patient replied she did not and disconnected.

## 2025-07-29 NOTE — TELEPHONE ENCOUNTER
Placed outbound call to patient to notify new bariatric paperwork email was sent, to note that failure to not complete the seminar in its entirety may cause the appointment to be cancelled or rescheduled, and to follow the instructions on the email to turn in the paperwork once all is completed. Patient did not answer, LVM with this information and C contact info in VM.

## 2025-07-29 NOTE — TELEPHONE ENCOUNTER
Identified patient with two patient identifiers (name and ). Reviewed chart in preparation for encounter and have obtained necessary documentation.   Patient called in regarding information for [facility], Patient sent seminar via email. Patient understood what was discussed and thankful for the help.

## 2025-08-04 ENCOUNTER — TELEPHONE (OUTPATIENT)
Age: 30
End: 2025-08-04

## 2025-08-05 ENCOUNTER — TELEPHONE (OUTPATIENT)
Age: 30
End: 2025-08-05

## (undated) DEVICE — SURGICAL PROCEDURE PACK GYN ONCOLOGY CUST ST MARYS LF

## (undated) DEVICE — REM POLYHESIVE ADULT PATIENT RETURN ELECTRODE: Brand: VALLEYLAB

## (undated) DEVICE — BASIN ST MAJOR-NO CAUTERY: Brand: MEDLINE INDUSTRIES, INC.

## (undated) DEVICE — SPONGE LAP 18X18IN STRL -- 5/PK

## (undated) DEVICE — SUTURE PDS II SZ 1 L96IN ABSRB VLT TP-1 L65MM 1/2 CIR Z880G

## (undated) DEVICE — BLADE ELECTRODE: Brand: EDGE

## (undated) DEVICE — SUTURE VCRL SZ 3-0 L27IN ABSRB UD L26MM SH 1/2 CIR J416H

## (undated) DEVICE — SUTURE MCRYL SZ 4-0 L27IN ABSRB UD L19MM PS-2 1/2 CIR PRIM Y426H

## (undated) DEVICE — TOTAL TRAY, DB, 100% SILI FOLEY, 16FR 10: Brand: MEDLINE

## (undated) DEVICE — GLOVE SURG SZ 75 L1212IN FNGR THK138MIL BRN LTX FREE

## (undated) DEVICE — DRAPE FLD WRM W44XL66IN C6L FOR INTRATEMP SYS THERMABASIN

## (undated) DEVICE — LEGGINGS, PAIR, 31X48, STERILE: Brand: MEDLINE

## (undated) DEVICE — TOWEL SURG W17XL27IN STD BLU COT NONFENESTRATED PREWASHED

## (undated) DEVICE — SOLUTION IRRIG 1000ML STRL H2O USP PLAS POUR BTL

## (undated) DEVICE — Z DISCONTINUED USE 2744636  DRESSING AQUACEL 14 IN ALG W3.5XL14IN POLYUR FLM CVR W/ HYDRCOLL

## (undated) DEVICE — SOLUTION IRRIG 1000ML 0.9% SOD CHL USP POUR PLAS BTL

## (undated) DEVICE — SUTURE VCRL SZ 2-0 L27IN ABSRB UD L26MM SH 1/2 CIR J417H

## (undated) DEVICE — SUTURE VCRL SZ 0 L36IN ABSRB VLT L36MM CT-1 1/2 CIR J346H

## (undated) DEVICE — PREP SKN CHLRAPRP APL 26ML STR --

## (undated) DEVICE — CURVED, LARGE JAW, OPEN SEALER/DIVIDER NANO-COATED: Brand: LIGASURE IMPACT

## (undated) DEVICE — COVER LT HNDL PLAS RIG 1 PER PK

## (undated) DEVICE — GLOVE SURG SZ 6 THK91MIL LTX FREE SYN POLYISOPRENE ANTI

## (undated) DEVICE — SUTURE VCRL SZ 2-0 L36IN ABSRB UD L36MM CT-1 1/2 CIR J945H

## (undated) DEVICE — DRAPE, LAVH, STERILE: Brand: MEDLINE

## (undated) DEVICE — GLOVE SURG SZ 6 L12IN FNGR THK79MIL GRN LTX FREE

## (undated) DEVICE — TRAY PREP DRY W/ PREM GLV 2 APPL 6 SPNG 2 UNDPD 1 OVERWRAP

## (undated) DEVICE — ROCKER SWITCH PENCIL BLADE ELECTRODE, HOLSTER: Brand: EDGE

## (undated) DEVICE — GARMENT,MEDLINE,DVT,INT,CALF,MED, GEN2: Brand: MEDLINE

## (undated) DEVICE — SUTURE PERMAHAND SZ 3-0 L18IN NONABSORBABLE BLK L26MM SH C013D

## (undated) DEVICE — STERILE-Z MAYO STAND COVERS CLEAR POLYETHYLENE STERILE UNIVERSAL FIT 20 PER CASE: Brand: STERILE-Z

## (undated) DEVICE — YANKAUER,POOLE TIP,STERILE,50/CS: Brand: MEDLINE

## (undated) DEVICE — PAD,SANITARY,11 IN,MAXI,N-STRL,IND WRAP: Brand: MEDLINE